# Patient Record
Sex: FEMALE | Race: WHITE | URBAN - METROPOLITAN AREA
[De-identification: names, ages, dates, MRNs, and addresses within clinical notes are randomized per-mention and may not be internally consistent; named-entity substitution may affect disease eponyms.]

---

## 2021-10-19 ENCOUNTER — LAB OUTSIDE AN ENCOUNTER (OUTPATIENT)
Dept: URBAN - METROPOLITAN AREA CLINIC 96 | Facility: CLINIC | Age: 61
End: 2021-10-19

## 2021-10-20 ENCOUNTER — OFFICE VISIT (OUTPATIENT)
Dept: URBAN - METROPOLITAN AREA CLINIC 96 | Facility: CLINIC | Age: 61
End: 2021-10-20
Payer: COMMERCIAL

## 2021-10-20 ENCOUNTER — WEB ENCOUNTER (OUTPATIENT)
Dept: URBAN - METROPOLITAN AREA CLINIC 96 | Facility: CLINIC | Age: 61
End: 2021-10-20

## 2021-10-20 DIAGNOSIS — F10.10 ALCOHOL ABUSE: ICD-10-CM

## 2021-10-20 DIAGNOSIS — Z12.11 COLON CANCER SCREENING: ICD-10-CM

## 2021-10-20 DIAGNOSIS — Z83.71 FAMILY HISTORY OF COLONIC POLYPS: ICD-10-CM

## 2021-10-20 DIAGNOSIS — K58.2 IRRITABLE BOWEL SYNDROME WITH BOTH CONSTIPATION AND DIARRHEA: ICD-10-CM

## 2021-10-20 DIAGNOSIS — Z83.49 FAMILY HISTORY OF ALPHA 1 ANTITRYPSIN DEFICIENCY: ICD-10-CM

## 2021-10-20 PROBLEM — 10743008: Status: ACTIVE | Noted: 2021-10-20

## 2021-10-20 PROCEDURE — 99204 OFFICE O/P NEW MOD 45 MIN: CPT | Performed by: INTERNAL MEDICINE

## 2021-10-20 RX ORDER — ONDANSETRON HYDROCHLORIDE 4 MG/1
1 TABLET AS NEEDED TABLET, FILM COATED ORAL ONCE A DAY
Qty: 2 | Refills: 0 | OUTPATIENT
Start: 2021-10-19

## 2021-10-20 RX ORDER — POLYETHYLENE GLYOCOL 3350, SODIUM CHLORIDE, SODIUM BICARBONATE AND POTASSIUM CHLORIDE 420; 11.2; 5.72; 1.48 G/4L; G/4L; G/4L; G/4L
AS DIRECTED POWDER, FOR SOLUTION NASOGASTRIC; ORAL AS DIRECTED
Qty: 420 GRAM | Refills: 0 | OUTPATIENT
Start: 2021-10-20 | End: 2021-10-21

## 2021-10-20 NOTE — HPI-TODAY'S VISIT:
62 yo female referred by Dr Rosi Fall for a GI consult for colon cancer screening and a copy of this note will be sent to the referring physician. Pt had a baseline colonoscopy 18 yrs and it was normal. Pt has IBS for many years. Pt has diarrhea and constipation on and off for years. No hx of GERD or anemia. Pts dad had colon pollyps. Pt has 2 surviving children, . Pt has a life partner. 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 Pts brother drank alcohol and  of cirrhosis and sister was dx with alpha 1 antitrypsin deficiency and was a drinker too. Pt admits to drinking lots of alcohol. Pt drinks about 3-4 glasses of wine a day now and was drinking much more prior to that.

## 2021-10-21 LAB — ALPHA-1-ANTITRYPSIN, SERUM: 75

## 2021-11-30 ENCOUNTER — OFFICE VISIT (OUTPATIENT)
Dept: URBAN - METROPOLITAN AREA CLINIC 98 | Facility: CLINIC | Age: 61
End: 2021-11-30
Payer: COMMERCIAL

## 2021-11-30 VITALS
SYSTOLIC BLOOD PRESSURE: 109 MMHG | TEMPERATURE: 97.3 F | WEIGHT: 142.6 LBS | HEART RATE: 67 BPM | DIASTOLIC BLOOD PRESSURE: 74 MMHG | BODY MASS INDEX: 22.92 KG/M2 | HEIGHT: 66 IN

## 2021-11-30 DIAGNOSIS — E88.01 ALPHA-1-ANTITRYPSIN DEFICIENCY: ICD-10-CM

## 2021-11-30 DIAGNOSIS — F10.10 ALCOHOL ABUSE: ICD-10-CM

## 2021-11-30 DIAGNOSIS — Z72.0 TOBACCO ABUSE: ICD-10-CM

## 2021-11-30 DIAGNOSIS — R10.84 GENERALIZED ABDOMINAL PAIN: ICD-10-CM

## 2021-11-30 PROCEDURE — 99214 OFFICE O/P EST MOD 30 MIN: CPT | Performed by: INTERNAL MEDICINE

## 2021-11-30 RX ORDER — ONDANSETRON HYDROCHLORIDE 4 MG/1
1 TABLET AS NEEDED TABLET, FILM COATED ORAL ONCE A DAY
Qty: 2 | Refills: 0 | Status: ACTIVE | COMMUNITY
Start: 2021-10-19

## 2021-11-30 NOTE — HPI-OTHER HISTORIES
here on referral from Dr Oliva for positive alpha 1 testing  little brother   - never went to doctor- but finally went and was diagnosed with cirrhosis.  Etoh heavy use.   soon thereafter sister  : did also have alcohol issues as well.   sister was diagnosed with AAT  became an advocate . parents still alive, both alcohol drinkers. sometimes heavly  2 children : 33yo born female transgender - gender neutral , 33 yo male . alcohol daily. in wine business for 25 yrs.  down to 2-3 drinks / day from 4-5 drinks / day

## 2021-12-03 ENCOUNTER — LAB OUTSIDE AN ENCOUNTER (OUTPATIENT)
Dept: URBAN - METROPOLITAN AREA CLINIC 98 | Facility: CLINIC | Age: 61
End: 2021-12-03

## 2021-12-16 LAB
A/G RATIO: 2.2
A1A PHENOTYPE CONFIRMATION: (no result)
A1A RFX TO PHENOTYPE: (no result)
AAT, DNA ANALYSIS: (no result)
ADDITIONAL INFORMATION:: (no result)
ALBUMIN: 4.6
ALKALINE PHOSPHATASE: 80
ALPHA-1-ANTITRYPSIN, SERUM: 78
ALT (SGPT): 27
AST (SGOT): 28
BASO (ABSOLUTE): 0
BASOS: 0
BILIRUBIN, TOTAL: 0.4
BUN/CREATININE RATIO: 15
BUN: 12
CALCIUM: 9.7
CARBON DIOXIDE, TOTAL: 22
CHLORIDE: 104
CREATININE: 0.8
EGFR IF AFRICN AM: 93
EGFR IF NONAFRICN AM: 81
EOS (ABSOLUTE): 0.1
EOS: 3
GGT: 15
GLOBULIN, TOTAL: 2.1
GLUCOSE: 87
HBSAG SCREEN: NEGATIVE
HCV AB: <0.1
HEMATOCRIT: 46.2
HEMATOLOGY COMMENTS:: (no result)
HEMOGLOBIN: 15.3
HEP A AB, TOTAL: NEGATIVE
HEP B CORE AB, TOT: NEGATIVE
HEPATITIS B SURF AB QUANT: <3.1
IMMATURE CELLS: (no result)
IMMATURE GRANS (ABS): 0
IMMATURE GRANULOCYTES: 0
INR: 0.9
INTERPRETATION: (no result)
INTERPRETATION:: (no result)
LYMPHS (ABSOLUTE): 1.9
LYMPHS: 40
Lab: (no result)
MCH: 33.3
MCHC: 33.1
MCV: 101
MONOCYTES(ABSOLUTE): 0.5
MONOCYTES: 11
NEUTROPHILS (ABSOLUTE): 2.1
NEUTROPHILS: 46
NRBC: (no result)
PLATELETS: 322
POTASSIUM: 4.9
PROTEIN, TOTAL: 6.7
PROTHROMBIN TIME: 9.6
RBC: 4.59
RDW: 11.4
SODIUM: 139
WBC: 4.7

## 2021-12-20 ENCOUNTER — TELEPHONE ENCOUNTER (OUTPATIENT)
Dept: URBAN - METROPOLITAN AREA CLINIC 98 | Facility: CLINIC | Age: 61
End: 2021-12-20

## 2021-12-28 ENCOUNTER — TELEPHONE ENCOUNTER (OUTPATIENT)
Dept: URBAN - METROPOLITAN AREA CLINIC 40 | Facility: CLINIC | Age: 61
End: 2021-12-28

## 2022-01-03 ENCOUNTER — LAB OUTSIDE AN ENCOUNTER (OUTPATIENT)
Dept: URBAN - METROPOLITAN AREA CLINIC 98 | Facility: CLINIC | Age: 62
End: 2022-01-03

## 2022-01-07 ENCOUNTER — TELEPHONE ENCOUNTER (OUTPATIENT)
Dept: URBAN - METROPOLITAN AREA CLINIC 98 | Facility: CLINIC | Age: 62
End: 2022-01-07

## 2022-01-07 ENCOUNTER — OFFICE VISIT (OUTPATIENT)
Dept: URBAN - METROPOLITAN AREA CLINIC 98 | Facility: CLINIC | Age: 62
End: 2022-01-07
Payer: COMMERCIAL

## 2022-01-07 DIAGNOSIS — R10.84 GENERALIZED ABDOMINAL PAIN: ICD-10-CM

## 2022-01-07 DIAGNOSIS — E88.01 ALPHA-1-ANTITRYPSIN DEFICIENCY: ICD-10-CM

## 2022-01-07 DIAGNOSIS — F10.10 ALCOHOL ABUSE: ICD-10-CM

## 2022-01-07 DIAGNOSIS — Z72.0 TOBACCO ABUSE: ICD-10-CM

## 2022-01-07 PROBLEM — 30188007: Status: ACTIVE | Noted: 2021-11-22

## 2022-01-07 PROBLEM — 15167005: Status: ACTIVE | Noted: 2021-10-20

## 2022-01-07 PROCEDURE — 99214 OFFICE O/P EST MOD 30 MIN: CPT | Performed by: INTERNAL MEDICINE

## 2022-01-07 RX ORDER — ONDANSETRON HYDROCHLORIDE 4 MG/1
1 TABLET AS NEEDED TABLET, FILM COATED ORAL ONCE A DAY
Qty: 2 | Refills: 0 | Status: ACTIVE | COMMUNITY
Start: 2021-10-19

## 2022-01-07 NOTE — HPI-TODAY'S VISIT:
Here to follow up with partner in background; generally feeling well although still with lower abdominal discomfort.  still drinking  alcohol on average 3 glasses wine a day

## 2022-01-07 NOTE — HPI-OTHER HISTORIES
CT ABDOMEN AND PELVIS WITH CONTRAST  CLINICAL INDICATION: Generalized abdominal pain.  TECHNIQUE: Transaxial CT imaging was performed from the lung bases to the pubic symphysis after the administration of 95 mL Omnipaque 350 contrast intravenously. Oral contrast material was also administered prior to the exam. Coronal and sagittal reformatted images were obtained and reviewed. This exam was performed utilizing automated exposure control as a radiation dose lowering technique.  COMPARISON: None.   FINDINGS: Imaging of the lower chest demonstrates linear bibasilar scarring.  ABDOMEN: A 0.6 cm hyperenhancing focus in the posterior right liver is too small to characterize but statistically represents a perfusion anomaly or hemangioma (series 2 image 16). No biliary ductal dilation. Gallbladder is nondistended, without calcified stones. Unremarkable spleen, pancreas, adrenals, and kidneys. No hydronephrosis. Small hiatal hernia suspected. Stomach is nondistended. No small bowel obstruction, pneumoperitoneum, or ascites. Normal appendix. No bowel wall thickening or inflammatory change. Aortic atherosclerosis without aneurysm. Portal system is patent. No abdominal adenopathy. Postsurgical changes, anterior abdominal wall.  PELVIS: Urinary bladder is nondistended and normal in contour. There is prominence of the parametrial vasculature bilaterally. Unremarkable rectum. No suspicious adnexal lesions. No pelvic free fluid or adenopathy.  MUSCULOSKELETAL: No aggressive appearing osseous lesions.  IMPRESSION: No acute process in the abdomen or pelvis.  Prominence of the parametrial vasculature which can be seen with pelvic congestion syndrome.

## 2022-01-21 ENCOUNTER — OFFICE VISIT (OUTPATIENT)
Dept: URBAN - METROPOLITAN AREA SURGERY CENTER 18 | Facility: SURGERY CENTER | Age: 62
End: 2022-01-21

## 2022-02-23 ENCOUNTER — OFFICE VISIT (OUTPATIENT)
Dept: URBAN - METROPOLITAN AREA SURGERY CENTER 18 | Facility: SURGERY CENTER | Age: 62
End: 2022-02-23

## 2022-04-20 PROBLEM — 305058001: Status: ACTIVE | Noted: 2021-12-13

## 2022-05-06 ENCOUNTER — OFFICE VISIT (OUTPATIENT)
Dept: URBAN - METROPOLITAN AREA SURGERY CENTER 18 | Facility: SURGERY CENTER | Age: 62
End: 2022-05-06
Payer: COMMERCIAL

## 2022-05-06 DIAGNOSIS — K63.5 BENIGN COLON POLYP: ICD-10-CM

## 2022-05-06 DIAGNOSIS — Z12.11 COLON CANCER SCREENING: ICD-10-CM

## 2022-05-06 DIAGNOSIS — Z83.71 FAMILY HISTORY OF COLON POLYPS: ICD-10-CM

## 2022-05-06 PROCEDURE — G8907 PT DOC NO EVENTS ON DISCHARG: HCPCS | Performed by: INTERNAL MEDICINE

## 2022-05-06 PROCEDURE — 45380 COLONOSCOPY AND BIOPSY: CPT | Performed by: INTERNAL MEDICINE

## 2023-01-07 ENCOUNTER — LAB OUTSIDE AN ENCOUNTER (OUTPATIENT)
Dept: URBAN - METROPOLITAN AREA CLINIC 98 | Facility: CLINIC | Age: 63
End: 2023-01-07

## 2024-03-28 ENCOUNTER — OV EP (OUTPATIENT)
Dept: URBAN - METROPOLITAN AREA CLINIC 96 | Facility: CLINIC | Age: 64
End: 2024-03-28
Payer: COMMERCIAL

## 2024-03-28 ENCOUNTER — LAB (OUTPATIENT)
Dept: URBAN - METROPOLITAN AREA CLINIC 96 | Facility: CLINIC | Age: 64
End: 2024-03-28

## 2024-03-28 VITALS
SYSTOLIC BLOOD PRESSURE: 104 MMHG | BODY MASS INDEX: 20.73 KG/M2 | HEIGHT: 66 IN | HEART RATE: 58 BPM | TEMPERATURE: 97.1 F | WEIGHT: 129 LBS | DIASTOLIC BLOOD PRESSURE: 71 MMHG

## 2024-03-28 DIAGNOSIS — Z12.11 COLON CANCER SCREENING: ICD-10-CM

## 2024-03-28 DIAGNOSIS — Z72.0 TOBACCO ABUSE: ICD-10-CM

## 2024-03-28 DIAGNOSIS — K58.1 IRRITABLE BOWEL SYNDROME WITH CONSTIPATION: ICD-10-CM

## 2024-03-28 DIAGNOSIS — E88.01 ALPHA-1-ANTITRYPSIN DEFICIENCY: ICD-10-CM

## 2024-03-28 DIAGNOSIS — R10.84 GENERALIZED ABDOMINAL PAIN: ICD-10-CM

## 2024-03-28 DIAGNOSIS — R14.0 ABDOMINAL BLOATING: ICD-10-CM

## 2024-03-28 PROBLEM — 440630006: Status: ACTIVE | Noted: 2024-03-28

## 2024-03-28 PROBLEM — 110483000: Status: ACTIVE | Noted: 2024-03-28

## 2024-03-28 PROBLEM — 116289008: Status: ACTIVE | Noted: 2024-03-28

## 2024-03-28 PROBLEM — 102614006: Status: ACTIVE | Noted: 2024-03-28

## 2024-03-28 PROCEDURE — 99214 OFFICE O/P EST MOD 30 MIN: CPT

## 2024-03-28 RX ORDER — SODIUM, POTASSIUM,MAG SULFATES 17.5-3.13G
AS DIRECTED SOLUTION, RECONSTITUTED, ORAL ORAL AS DIRECTED
Qty: 1 | Refills: 0 | OUTPATIENT
Start: 2024-03-28 | End: 2024-03-29

## 2024-03-28 RX ORDER — ONDANSETRON HYDROCHLORIDE 4 MG/1
1 TABLET AS NEEDED TABLET, FILM COATED ORAL ONCE A DAY
Qty: 2 | Refills: 0 | Status: ON HOLD | COMMUNITY
Start: 2021-10-19

## 2024-03-28 NOTE — HPI-TODAY'S VISIT:
64 year old female presents for evaluation of constipation and abdominal pain.  Will go 2-3 days without BM.  Will take Laxative that causes diarrhea. Started using a squatty potty but still struggles to pass stool. Abdominal pain improves after BM - but abdominal discomfort is almost always present Symptoms are chronic. No blood or melena. Reports post-prandial abdominal bloating and is asking for dietary advice.  Started using chantix and is down to 7 cigarettes per day. Drinks wine daily - trying to cut down. Sees Dr. Joyner for A1AT surveillance.

## 2024-03-28 NOTE — HPI-OTHER HISTORIES
// Colonoscopy, 5/6/2022, Dr. Oliva: Suboptimal prep. Three 4-5 mm hyperplastic polyps in recto-sigmoid colon. Redundant colon. 1 year FU

## 2024-05-07 ENCOUNTER — DASHBOARD ENCOUNTERS (OUTPATIENT)
Age: 64
End: 2024-05-07

## 2024-05-08 ENCOUNTER — CLAIMS CREATED FROM THE CLAIM WINDOW (OUTPATIENT)
Dept: URBAN - METROPOLITAN AREA CLINIC 4 | Facility: CLINIC | Age: 64
End: 2024-05-08
Payer: COMMERCIAL

## 2024-05-08 ENCOUNTER — OUT OF OFFICE VISIT (OUTPATIENT)
Dept: URBAN - METROPOLITAN AREA SURGERY CENTER 18 | Facility: SURGERY CENTER | Age: 64
End: 2024-05-08
Payer: COMMERCIAL

## 2024-05-08 VITALS — BODY MASS INDEX: 20.73 KG/M2 | WEIGHT: 129 LBS | HEIGHT: 66 IN

## 2024-05-08 DIAGNOSIS — D12.3 ADENOMATOUS POLYP OF TRANSVERSE COLON: ICD-10-CM

## 2024-05-08 DIAGNOSIS — Z12.11 COLON CANCER SCREENING (HIGH RISK): ICD-10-CM

## 2024-05-08 DIAGNOSIS — K63.5 BENIGN COLON POLYP: ICD-10-CM

## 2024-05-08 DIAGNOSIS — Z86.010 ADENOMAS PERSONAL HISTORY OF COLONIC POLYPS: ICD-10-CM

## 2024-05-08 DIAGNOSIS — Z86.010 PERSONAL HISTORY OF COLON POLYPS: ICD-10-CM

## 2024-05-08 DIAGNOSIS — K63.89 OTHER SPECIFIED DISEASES OF INTESTINE: ICD-10-CM

## 2024-05-08 PROCEDURE — 45380 COLONOSCOPY AND BIOPSY: CPT | Performed by: INTERNAL MEDICINE

## 2024-05-08 PROCEDURE — 88305 TISSUE EXAM BY PATHOLOGIST: CPT | Performed by: PATHOLOGY

## 2024-05-08 PROCEDURE — G8907 PT DOC NO EVENTS ON DISCHARG: HCPCS | Performed by: INTERNAL MEDICINE

## 2024-05-08 PROCEDURE — 00812 ANES LWR INTST SCR COLSC: CPT | Performed by: NURSE ANESTHETIST, CERTIFIED REGISTERED

## 2024-05-08 RX ORDER — SODIUM SULFATE, POTASSIUM SULFATE, MAGNESIUM SULFATE 17.5; 3.13; 1.6 G/ML; G/ML; G/ML
AS DIRECTED SOLUTION, CONCENTRATE ORAL AS DIRECTED
Qty: 1 | Refills: 0 | Status: ON HOLD | COMMUNITY

## 2024-05-08 RX ORDER — LINACLOTIDE 145 UG/1
1 CAPSULE AT LEAST 30 MINUTES BEFORE THE FIRST MEAL OF THE DAY ON AN EMPTY STOMACH CAPSULE, GELATIN COATED ORAL ONCE A DAY
Qty: 30 | Refills: 3 | Status: ACTIVE | COMMUNITY
Start: 2024-04-08 | End: 2024-08-06

## 2024-05-08 RX ORDER — ONDANSETRON HYDROCHLORIDE 4 MG/1
1 TABLET AS NEEDED TABLET, FILM COATED ORAL ONCE A DAY
Qty: 2 | Refills: 0 | Status: ON HOLD | COMMUNITY
Start: 2021-10-19

## 2024-05-14 ENCOUNTER — TELEPHONE ENCOUNTER (OUTPATIENT)
Dept: URBAN - METROPOLITAN AREA CLINIC 96 | Facility: CLINIC | Age: 64
End: 2024-05-14

## 2024-05-30 ENCOUNTER — OFFICE VISIT (OUTPATIENT)
Dept: URBAN - METROPOLITAN AREA CLINIC 96 | Facility: CLINIC | Age: 64
End: 2024-05-30

## 2024-07-30 ENCOUNTER — OFFICE VISIT (OUTPATIENT)
Dept: URBAN - METROPOLITAN AREA CLINIC 98 | Facility: CLINIC | Age: 64
End: 2024-07-30

## 2024-09-19 ENCOUNTER — OFFICE VISIT (OUTPATIENT)
Dept: URBAN - METROPOLITAN AREA CLINIC 98 | Facility: CLINIC | Age: 64
End: 2024-09-19
Payer: COMMERCIAL

## 2024-09-19 ENCOUNTER — LAB OUTSIDE AN ENCOUNTER (OUTPATIENT)
Dept: URBAN - METROPOLITAN AREA CLINIC 98 | Facility: CLINIC | Age: 64
End: 2024-09-19

## 2024-09-19 VITALS
HEIGHT: 66 IN | TEMPERATURE: 97 F | DIASTOLIC BLOOD PRESSURE: 76 MMHG | WEIGHT: 127.8 LBS | SYSTOLIC BLOOD PRESSURE: 139 MMHG | HEART RATE: 73 BPM | BODY MASS INDEX: 20.54 KG/M2

## 2024-09-19 DIAGNOSIS — R10.84 GENERALIZED ABDOMINAL PAIN: ICD-10-CM

## 2024-09-19 DIAGNOSIS — E88.01 ALPHA-1-ANTITRYPSIN DEFICIENCY: ICD-10-CM

## 2024-09-19 DIAGNOSIS — F10.10 ALCOHOL ABUSE: ICD-10-CM

## 2024-09-19 DIAGNOSIS — Z72.0 TOBACCO ABUSE: ICD-10-CM

## 2024-09-19 PROCEDURE — 99214 OFFICE O/P EST MOD 30 MIN: CPT | Performed by: INTERNAL MEDICINE

## 2024-09-19 RX ORDER — ONDANSETRON HYDROCHLORIDE 4 MG/1
1 TABLET AS NEEDED TABLET, FILM COATED ORAL ONCE A DAY
Qty: 2 | Refills: 0 | Status: ON HOLD | COMMUNITY
Start: 2021-10-19

## 2024-09-19 NOTE — HPI-TODAY'S VISIT:
Here to follow up on AAT  Last seen by myself 1/2022  Labs w Dr Fall in [place] - sugar high  still smoking : Dr Fall checks her Xrays she is not seeing Pulm still drinking "too much"  at least 3 glasses of wine / night : cocktail once on Sunday

## 2024-09-24 LAB
ALBUMIN: 4.9
ALKALINE PHOSPHATASE: 86
ALT (SGPT): 19
AST (SGOT): 30
BASO (ABSOLUTE): 0.1
BASOS: 1
BILIRUBIN, TOTAL: 0.5
BUN/CREATININE RATIO: 14
BUN: 10
CALCIUM: 10
CARBON DIOXIDE, TOTAL: 24
CHLORIDE: 101
CREATININE: 0.73
EGFR: 93
ELF(TM) SCORE: 10.26
EOS (ABSOLUTE): 0.1
EOS: 2
GGT: 14
GLOBULIN, TOTAL: 2.1
GLUCOSE: 81
HEMATOCRIT: 45.7
HEMATOLOGY COMMENTS:: (no result)
HEMOGLOBIN: 15.3
IMMATURE CELLS: (no result)
IMMATURE GRANS (ABS): 0
IMMATURE GRANULOCYTES: 0
LYMPHS (ABSOLUTE): 1.7
LYMPHS: 34
MCH: 33.3
MCHC: 33.5
MCV: 99
MONOCYTES(ABSOLUTE): 0.7
MONOCYTES: 14
NEUTROPHILS (ABSOLUTE): 2.6
NEUTROPHILS: 49
NRBC: (no result)
PLATELETS: 344
POTASSIUM: 5.1
PROTEIN, TOTAL: 7
RBC: 4.6
RDW: 12.6
SODIUM: 140
WBC: 5.2

## 2024-09-25 ENCOUNTER — TELEPHONE ENCOUNTER (OUTPATIENT)
Dept: URBAN - METROPOLITAN AREA CLINIC 98 | Facility: CLINIC | Age: 64
End: 2024-09-25

## 2024-11-26 ENCOUNTER — TELEPHONE ENCOUNTER (OUTPATIENT)
Dept: URBAN - METROPOLITAN AREA CLINIC 96 | Facility: CLINIC | Age: 64
End: 2024-11-26

## 2024-11-26 RX ORDER — LINACLOTIDE 145 UG/1
1 CAPSULE AT LEAST 30 MINUTES BEFORE THE FIRST MEAL OF THE DAY ON AN EMPTY STOMACH CAPSULE, GELATIN COATED ORAL ONCE A DAY
Qty: 30 | Refills: 3 | OUTPATIENT
Start: 2024-11-26 | End: 2025-03-26